# Patient Record
Sex: FEMALE | Race: WHITE
[De-identification: names, ages, dates, MRNs, and addresses within clinical notes are randomized per-mention and may not be internally consistent; named-entity substitution may affect disease eponyms.]

---

## 2019-06-09 ENCOUNTER — HOSPITAL ENCOUNTER (EMERGENCY)
Dept: HOSPITAL 91 - FTE | Age: 24
Discharge: HOME | End: 2019-06-09
Payer: MEDICAID

## 2019-06-09 ENCOUNTER — HOSPITAL ENCOUNTER (EMERGENCY)
Dept: HOSPITAL 10 - FTE | Age: 24
Discharge: HOME | End: 2019-06-09
Payer: MEDICAID

## 2019-06-09 VITALS — RESPIRATION RATE: 18 BRPM | DIASTOLIC BLOOD PRESSURE: 65 MMHG | SYSTOLIC BLOOD PRESSURE: 94 MMHG | HEART RATE: 92 BPM

## 2019-06-09 VITALS
HEIGHT: 65 IN | BODY MASS INDEX: 28.83 KG/M2 | HEIGHT: 65 IN | WEIGHT: 173.06 LBS | WEIGHT: 173.06 LBS | BODY MASS INDEX: 28.83 KG/M2

## 2019-06-09 DIAGNOSIS — N83.8: Primary | ICD-10-CM

## 2019-06-09 LAB
ADD MAN DIFF?: NO
ADD UMIC: YES
ALANINE AMINOTRANSFERASE: 22 IU/L (ref 13–69)
ALBUMIN/GLOBULIN RATIO: 1.48
ALBUMIN: 4.3 G/DL (ref 3.3–4.9)
ALKALINE PHOSPHATASE: 77 IU/L (ref 42–121)
ANION GAP: 7 (ref 5–13)
ASPARTATE AMINO TRANSFERASE: 20 IU/L (ref 15–46)
BASOPHIL #: 0 10^3/UL (ref 0–0.1)
BASOPHILS %: 0.2 % (ref 0–2)
BILIRUBIN,DIRECT: 0 MG/DL (ref 0–0.2)
BILIRUBIN,TOTAL: 0.3 MG/DL (ref 0.2–1.3)
BLOOD UREA NITROGEN: 13 MG/DL (ref 7–20)
CALCIUM: 9.8 MG/DL (ref 8.4–10.2)
CARBON DIOXIDE: 28 MMOL/L (ref 21–31)
CHLORIDE: 103 MMOL/L (ref 97–110)
CREATININE: 0.69 MG/DL (ref 0.44–1)
EOSINOPHILS #: 0.1 10^3/UL (ref 0–0.5)
EOSINOPHILS %: 0.8 % (ref 0–7)
GLOBULIN: 2.9 G/DL (ref 1.3–3.2)
GLUCOSE: 121 MG/DL (ref 70–220)
HEMATOCRIT: 35.6 % (ref 37–47)
HEMOGLOBIN: 12.2 G/DL (ref 12–16)
IMMATURE GRANS #M: 0.1 10^3/UL (ref 0–0.03)
IMMATURE GRANS % (M): 0.7 % (ref 0–0.43)
LIPASE: 59 U/L (ref 23–300)
LYMPHOCYTES #: 2.8 10^3/UL (ref 0.8–2.9)
LYMPHOCYTES %: 19.5 % (ref 15–51)
MEAN CORPUSCULAR HEMOGLOBIN: 30.3 PG (ref 29–33)
MEAN CORPUSCULAR HGB CONC: 34.3 G/DL (ref 32–37)
MEAN CORPUSCULAR VOLUME: 88.3 FL (ref 82–101)
MEAN PLATELET VOLUME: 10 FL (ref 7.4–10.4)
MONOCYTE #: 1 10^3/UL (ref 0.3–0.9)
MONOCYTES %: 6.7 % (ref 0–11)
NEUTROPHIL #: 10.4 10^3/UL (ref 1.6–7.5)
NEUTROPHILS %: 72.1 % (ref 39–77)
NUCLEATED RED BLOOD CELLS #: 0 10^3/UL (ref 0–0)
NUCLEATED RED BLOOD CELLS%: 0 /100WBC (ref 0–0)
PLATELET COUNT: 266 10^3/UL (ref 140–415)
POTASSIUM: 3.8 MMOL/L (ref 3.5–5.1)
RED BLOOD COUNT: 4.03 10^6/UL (ref 4.2–5.4)
RED CELL DISTRIBUTION WIDTH: 12.3 % (ref 11.5–14.5)
SODIUM: 138 MMOL/L (ref 135–144)
TOTAL PROTEIN: 7.2 G/DL (ref 6.1–8.1)
UR ASCORBIC ACID: NEGATIVE MG/DL
UR BACTERIA: (no result) /HPF
UR BILIRUBIN (DIP): NEGATIVE MG/DL
UR BLOOD (DIP): NEGATIVE MG/DL
UR CLARITY: (no result)
UR COLOR: YELLOW
UR GLUCOSE (DIP): NEGATIVE MG/DL
UR KETONES (DIP): NEGATIVE MG/DL
UR LEUKOCYTE ESTERASE (DIP): (no result) LEU/UL
UR MUCUS: (no result) /HPF
UR NITRITE (DIP): NEGATIVE MG/DL
UR PH (DIP): 5 (ref 5–9)
UR RBC: 1 /HPF (ref 0–5)
UR SPECIFIC GRAVITY (DIP): 1.01 (ref 1–1.03)
UR SQUAMOUS EPITHELIAL CELL: (no result) /HPF
UR TOTAL PROTEIN (DIP): NEGATIVE MG/DL
UR UROBILINOGEN (DIP): NEGATIVE MG/DL
UR WBC: 10 /HPF (ref 0–5)
WHITE BLOOD COUNT: 14.4 10^3/UL (ref 4.8–10.8)

## 2019-06-09 PROCEDURE — 36415 COLL VENOUS BLD VENIPUNCTURE: CPT

## 2019-06-09 PROCEDURE — 83690 ASSAY OF LIPASE: CPT

## 2019-06-09 PROCEDURE — 76830 TRANSVAGINAL US NON-OB: CPT

## 2019-06-09 PROCEDURE — 96372 THER/PROPH/DIAG INJ SC/IM: CPT

## 2019-06-09 PROCEDURE — 85025 COMPLETE CBC W/AUTO DIFF WBC: CPT

## 2019-06-09 PROCEDURE — 81025 URINE PREGNANCY TEST: CPT

## 2019-06-09 PROCEDURE — 76856 US EXAM PELVIC COMPLETE: CPT

## 2019-06-09 PROCEDURE — 80053 COMPREHEN METABOLIC PANEL: CPT

## 2019-06-09 PROCEDURE — 99285 EMERGENCY DEPT VISIT HI MDM: CPT

## 2019-06-09 PROCEDURE — 76705 ECHO EXAM OF ABDOMEN: CPT

## 2019-06-09 PROCEDURE — 81001 URINALYSIS AUTO W/SCOPE: CPT

## 2019-06-09 RX ADMIN — KETOROLAC TROMETHAMINE 1 MG: 30 INJECTION, SOLUTION INTRAMUSCULAR at 03:29

## 2019-06-09 NOTE — ERD
ER Documentation


Chief Complaint


Chief Complaint





PELVIC PAIN X'S 2 DAYS





ROS


All systems reviewed and are negative except as per history of present illness.





Medications


Home Meds


Active Scripts


Ibuprofen* (Motrin*) 600 Mg Tab, 600 MG PO Q6H PRN for PAIN AND OR ELEVATED 


TEMP, #30 TAB


   Prov:CINDI SYED DO         6/9/19


Acetaminophen* (Acetaminophen*) 500 MG Extra Strength Tablet, 500 MG PO Q4H PRN 


for PAIN AND OR ELEVATED TEMP, #30 TAB


   Prov:CINDI SYED DO         6/9/19





Allergies


Allergies:  


Coded Allergies:  


     No Known Allergy (Unverified , 6/9/19)





PMhx/Soc


Medical and Surgical Hx:  pt denies Medical Hx, pt denies Surgical Hx


Hx Alcohol Use:  No


Hx Substance Use:  No


Hx Tobacco Use:  No


Smoking Status:  Never smoker





Physical Exam


Vitals





Vital Signs


  Date      Temp  Pulse  Resp  B/P (MAP)   Pulse Ox  O2          O2 Flow    FiO2


Time                                                 Delivery    Rate


    6/9/19  97.9     92    18  94/65 (75)        98


     02:53





Physical Exam


Const:   No acute distress


Head:   Atraumatic 


Eyes:    Normal Conjunctiva


ENT:    Normal External Ears, Nose and Mouth.


Neck:               Full range of motion. No meningismus.


Resp:   Clear to auscultation bilaterally


Cardio:   Regular rate and rhythm, no murmurs


Abd:    Soft, non tender, non distended. Normal bowel sounds


Skin:   No petechiae or rashes


Back:   No midline or flank tenderness


Ext:    No cyanosis, or edema


Neur:   Awake and alert


Psych:    Normal Mood and Affect


Result Diagram:  


6/9/19 0319                                                                     


          6/9/19 0319





Results 24 hrs





Laboratory Tests


       Test
                                   6/9/19
03:19  6/9/19
03:22


       White Blood Count                      14.4 10^3/ul


       Red Blood Count                        4.03 10^6/ul


       Hemoglobin                                12.2 g/dl


       Hematocrit                                   35.6 %


       Mean Corpuscular Volume                     88.3 fl


       Mean Corpuscular Hemoglobin                 30.3 pg


       Mean Corpuscular Hemoglobin
Concent      34.3 g/dl 
  



       Red Cell Distribution Width                  12.3 %


       Platelet Count                          266 10^3/UL


       Mean Platelet Volume                        10.0 fl


       Immature Granulocytes %                     0.700 %


       Neutrophils %                                72.1 %


       Lymphocytes %                                19.5 %


       Monocytes %                                   6.7 %


       Eosinophils %                                 0.8 %


       Basophils %                                   0.2 %


       Nucleated Red Blood Cells %             0.0 /100WBC


       Immature Granulocytes #               0.100 10^3/ul


       Neutrophils #                          10.4 10^3/ul


       Lymphocytes #                           2.8 10^3/ul


       Monocytes #                             1.0 10^3/ul


       Eosinophils #                           0.1 10^3/ul


       Basophils #                             0.0 10^3/ul


       Nucleated Red Blood Cells #             0.0 10^3/ul


       Urine Color                          YELLOW


       Urine Clarity
                       SLIGHTLY
CLOUDY  



       Urine pH                                        5.0


       Urine Specific Gravity                        1.012


       Urine Ketones                        NEGATIVE mg/dL


       Urine Nitrite                        NEGATIVE mg/dL


       Urine Bilirubin                      NEGATIVE mg/dL


       Urine Urobilinogen                   NEGATIVE mg/dL


       Urine Leukocyte Esterase             TRACE Kiran/ul


       Urine Microscopic RBC                        1 /HPF


       Urine Microscopic WBC                       10 /HPF


       Urine Squamous Epithelial
Cells      FEW /HPF 
       



       Urine Bacteria                       FEW /HPF


       Urine Mucus                          FEW /HPF


       Urine Hemoglobin                     NEGATIVE mg/dL


       Urine Glucose                        NEGATIVE mg/dL


       Urine Total Protein                  NEGATIVE mg/dl


       Sodium Level                             138 mmol/L


       Potassium Level                          3.8 mmol/L


       Chloride Level                           103 mmol/L


       Carbon Dioxide Level                      28 mmol/L


       Anion Gap                                         7


       Blood Urea Nitrogen                        13 mg/dl


       Creatinine                               0.69 mg/dl


       Est Glomerular Filtrat Rate
mL/min   > 60 mL/min 
    



       Glucose Level                             121 mg/dl


       Calcium Level                             9.8 mg/dl


       Total Bilirubin                           0.3 mg/dl


       Direct Bilirubin                         0.00 mg/dl


       Indirect Bilirubin                        0.3 mg/dl


       Aspartate Amino Transf
(AST/SGOT)          20 IU/L 
  



       Alanine Aminotransferase
(ALT/SGPT)        22 IU/L 
  



       Alkaline Phosphatase                        77 IU/L


       Total Protein                              7.2 g/dl


       Albumin                                    4.3 g/dl


       Globulin                                  2.90 g/dl


       Albumin/Globulin Ratio                         1.48


       Lipase                                       59 U/L


       POC Beta HCG, Qualitative                             NEGATIVE





Current Medications


 Medications
   Dose
          Sig/Belen
       Start Time
   Status  Last


 (Trade)       Ordered        Route
 PRN     Stop Time              Admin
Dose


                              Reason                                Admin


 Ketorolac
     30 mg          ONCE  STAT
    6/9/19        DC            6/9/19


Tromethamine
                 IM
            03:15
 6/9/19                03:29



 (Toradol)                                   03:17








Departure


Diagnosis:  


   Primary Impression:  


   Ruptured ovarian cyst


Condition:  Fair


Patient Instructions:  Ovarian Cyst


Referrals:  


COMM[facility] CL[facility]


YOU HAVE RECEIVED A MEDICAL SCREENING EXAM AND THE RESULTS INDICATE THAT YOU DO 


NOT HAVE A CONDITION THAT REQUIRES URGENT TREATMENT IN THE EMERGENCY DEPARTMENT.





FURTHER EVALUATION AND TREATMENT OF YOUR CONDITION CAN WAIT UNTIL YOU ARE SEEN 


IN YOUR DOCTORS OFFICE WITHIN THE NEXT 1-2 DAYS. IT IS YOUR RESPONSIBILITY TO 


MAKE AN APPOINTMENT FOR FOLOW-UP CARE.





IF YOU HAVE A PRIMARY DOCTOR


--you should call your primary doctor and schedule an appointment





IF YOU DO NOT HAVE A PRIMARY DOCTOR YOU CAN CALL OUR PHYSICIAN REFERRAL HOTLINE 


AT


 [phone] 





IF YOU CAN NOT AFFORD TO SEE A PHYSICIAN YOU CAN CHOSE FROM THE FOLLOWING 


[facility] CLINICS





[facility] [phone] [address] [facility]. [facility] [phone] [address] [facility]. [facility] [phone] [address] [facility]. [facility] [phone] [address] [facility] [facility] [phone] [address] [facility]. [facility] [phone] [address]








OB/GYN REFERRAL LIST


IVAN SHABAZZ MD


[address] [facility] 


[address] [place], [place] [id number] [phone] OFFICE FAX [phone]





, STEVE


[address] [place], [place] [id number] [phone]





[facility]


[address] [place] [address]


[phone]





DR FAITH, [facility]


[address], [address], [facility] [address]


[phone]





DR TORRES ADAM


[address] [place] [address]


[phone]





[facility]


[address] [place], [place] [id number] [phone] [address] [facility] [address]


[phone]





MARIZOL WILSON


[address], [facility] [address]


[phone]





DR BERMUDEZ [facility]


[address], [facility] [address] [phone]





GLENNY VILA


[address] [place] [address] [phone]





Additional Instructions:  


Call your primary care doctor TOMORROW for an appointment during the next 1-2 


days.See the doctor sooner or return here if your condition worsens before your 


appointment time.





Follow up with OB/GYN to monitor ovarian cyst











CINDI SYED DO                  Jun 9, 2019 05:39